# Patient Record
Sex: FEMALE | Race: BLACK OR AFRICAN AMERICAN | NOT HISPANIC OR LATINO | Employment: FULL TIME | URBAN - METROPOLITAN AREA
[De-identification: names, ages, dates, MRNs, and addresses within clinical notes are randomized per-mention and may not be internally consistent; named-entity substitution may affect disease eponyms.]

---

## 2022-02-11 ENCOUNTER — OFFICE VISIT (OUTPATIENT)
Dept: INTERNAL MEDICINE | Facility: CLINIC | Age: 27
End: 2022-02-11
Attending: INTERNAL MEDICINE
Payer: COMMERCIAL

## 2022-02-11 VITALS
DIASTOLIC BLOOD PRESSURE: 77 MMHG | HEART RATE: 86 BPM | HEIGHT: 62 IN | BODY MASS INDEX: 22.63 KG/M2 | SYSTOLIC BLOOD PRESSURE: 113 MMHG | WEIGHT: 123 LBS

## 2022-02-11 DIAGNOSIS — Z30.41 ENCOUNTER FOR SURVEILLANCE OF CONTRACEPTIVE PILLS: ICD-10-CM

## 2022-02-11 DIAGNOSIS — L65.9 HAIR LOSS: Primary | ICD-10-CM

## 2022-02-11 PROCEDURE — 99204 OFFICE O/P NEW MOD 45 MIN: CPT | Performed by: INTERNAL MEDICINE

## 2022-02-11 RX ORDER — DROSPIRENONE AND ETHINYL ESTRADIOL 0.02-3(28)
1 KIT ORAL DAILY
COMMUNITY
End: 2022-02-11

## 2022-02-11 RX ORDER — IBUPROFEN 800 MG/1
TABLET, FILM COATED ORAL
COMMUNITY
Start: 2021-04-28 | End: 2024-05-07

## 2022-02-11 RX ORDER — DROSPIRENONE AND ETHINYL ESTRADIOL 0.02-3(28)
1 KIT ORAL DAILY
Qty: 82 TABLET | Refills: 4 | Status: SHIPPED | OUTPATIENT
Start: 2022-02-11 | End: 2022-05-31

## 2022-02-11 RX ORDER — LEVOCETIRIZINE DIHYDROCHLORIDE 5 MG/1
TABLET, FILM COATED ORAL
COMMUNITY
Start: 2021-04-28 | End: 2022-02-11

## 2022-02-11 RX ORDER — PSEUDOEPHEDRINE HCL 60 MG
1 TABLET ORAL PRN
COMMUNITY
Start: 2021-04-28 | End: 2024-05-07

## 2022-02-11 RX ORDER — NORETHINDRONE ACETATE AND ETHINYL ESTRADIOL AND FERROUS FUMARATE 5-7-9-7
KIT ORAL
COMMUNITY
End: 2022-02-11

## 2022-02-11 ASSESSMENT — PAIN SCALES - GENERAL: PAINLEVEL: NO PAIN (0)

## 2022-02-11 ASSESSMENT — MIFFLIN-ST. JEOR: SCORE: 1257.2

## 2022-02-11 NOTE — LETTER
2/11/2022       RE: Joel Shah  [address] [facility] [id number]     Dear Colleague,    Thank you for referring your patient, Joel Shah, to the [facility] [place] at [facility]. Please see a copy of my visit note below.      Assessment & Plan     Hair loss  Reviewed possible causes of hair loss with patient.  Advised on common contributors, such as iron deficiency as well as acute illness, such as COVID-19.  Of note, patient did have COVID-19 earlier this year.  Recommend evaluation for common endocrine and metabolic causes.  Patient was also encouraged to talk to her dermatologist about hair loss.  - TSH with free T4 reflex; Future  - CBC with platelets; Future  - Ferritin; Future  - Comprehensive metabolic panel; Future  - Anti Nuclear Mariama IgG by IFA with Reflex; Future    Encounter for surveillance of contraceptive pills  Discussed options for birth control with patient.  She noted good relief of dysmenorrhea with birth control pill.  Reviewed weight gain and insignificant contribution of birth control pill to weight gain that was observed in clinical studies.  Patient was also advised on possible interaction between spironolactone and risperidone.  Recommend monitoring of electrolytes if she will be on a combination of the 2 medications.  Patient was also encouraged to discuss her treatment options for acne with her dermatologist.  Given that patient desires ongoing control of her menses due to dysmenorrhea, recommend restarting birth control pill.  Patient expressed understanding and agreement with the plan.  - drospirenone-ethinyl estradiol (RUFINO) 3-0.02 MG tablet; Take 1 tablet by mouth daily      I spent a total of 45 minutes on the day of the visit.   Time spent doing chart review, history and exam, documentation and further activities per the note           No follow-ups on file.    Rosario Gutierrez MD  [facility]  "[facility] Maldonado is a 26 year old who presents for the following health issues     HPI     Patient is here to discuss contraceptive options. She states that she has started birth control pill several years ago. She states that that had painful periods prior to starting birth control pill. She is currently not sexually active.   Patient states that she was treated for acne with Accutane by her dermatologist.  She was also recently started on spironolactone.  She is wondering if she should continue with spironolactone or with birth control pill.  She has not had any recent electrolyte checks since starting spironolactone.    Review of Systems   Constitutional, HEENT, cardiovascular, pulmonary, GI, , musculoskeletal, neuro, skin, endocrine and psych systems are negative, except as otherwise noted.      Objective    /77   Pulse 86   Ht 1.568 m (5' 1.75\")   Wt 55.8 kg (123 lb)   LMP 01/11/2022   Breastfeeding No   BMI 22.68 kg/m    Body mass index is 22.68 kg/m .  Physical Exam   GENERAL: healthy, alert and no distress  EYES: Eyes grossly normal to inspection, PERRL and conjunctivae and sclerae normal  NECK: no adenopathy and thyroid normal to palpation  RESP: normal effort, no wheezing  CV: regular rates and rhythm and no peripheral edema  MS: no gross musculoskeletal defects noted, no edema      "

## 2022-02-11 NOTE — NURSING NOTE
Chief Complaint   Patient presents with     Establish Care     Birth control consult   Brianna Hayes LPN

## 2022-02-11 NOTE — PROGRESS NOTES
Assessment & Plan     Hair loss  Reviewed possible causes of hair loss with patient.  Advised on common contributors, such as iron deficiency as well as acute illness, such as COVID-19.  Of note, patient did have COVID-19 earlier this year.  Recommend evaluation for common endocrine and metabolic causes.  Patient was also encouraged to talk to her dermatologist about hair loss.  - TSH with free T4 reflex; Future  - CBC with platelets; Future  - Ferritin; Future  - Comprehensive metabolic panel; Future  - Anti Nuclear Mariama IgG by IFA with Reflex; Future    Encounter for surveillance of contraceptive pills  Discussed options for birth control with patient.  She noted good relief of dysmenorrhea with birth control pill.  Reviewed weight gain and insignificant contribution of birth control pill to weight gain that was observed in clinical studies.  Patient was also advised on possible interaction between spironolactone and risperidone.  Recommend monitoring of electrolytes if she will be on a combination of the 2 medications.  Patient was also encouraged to discuss her treatment options for acne with her dermatologist.  Given that patient desires ongoing control of her menses due to dysmenorrhea, recommend restarting birth control pill.  Patient expressed understanding and agreement with the plan.  - drospirenone-ethinyl estradiol (RUFINO) 3-0.02 MG tablet; Take 1 tablet by mouth daily      I spent a total of 45 minutes on the day of the visit.   Time spent doing chart review, history and exam, documentation and further activities per the note           No follow-ups on file.    Rosario Gutierrez MD  [facility]   Joel is a 26 year old who presents for the following health issues     HPI     Patient is here to discuss contraceptive options. She states that she has started birth control pill several years ago. She states that that had painful periods prior to starting  "birth control pill. She is currently not sexually active.   Patient states that she was treated for acne with Accutane by her dermatologist.  She was also recently started on spironolactone.  She is wondering if she should continue with spironolactone or with birth control pill.  She has not had any recent electrolyte checks since starting spironolactone.    Review of Systems   Constitutional, HEENT, cardiovascular, pulmonary, GI, , musculoskeletal, neuro, skin, endocrine and psych systems are negative, except as otherwise noted.      Objective    /77   Pulse 86   Ht 1.568 m (5' 1.75\")   Wt 55.8 kg (123 lb)   LMP 01/11/2022   Breastfeeding No   BMI 22.68 kg/m    Body mass index is 22.68 kg/m .  Physical Exam   GENERAL: healthy, alert and no distress  EYES: Eyes grossly normal to inspection, PERRL and conjunctivae and sclerae normal  NECK: no adenopathy and thyroid normal to palpation  RESP: normal effort, no wheezing  CV: regular rates and rhythm and no peripheral edema  MS: no gross musculoskeletal defects noted, no edema                "

## 2022-02-27 ENCOUNTER — HEALTH MAINTENANCE LETTER (OUTPATIENT)
Age: 27
End: 2022-02-27

## 2022-05-31 ENCOUNTER — OFFICE VISIT (OUTPATIENT)
Dept: OBGYN | Facility: CLINIC | Age: 27
End: 2022-05-31
Payer: COMMERCIAL

## 2022-05-31 VITALS — WEIGHT: 133.4 LBS | SYSTOLIC BLOOD PRESSURE: 90 MMHG | BODY MASS INDEX: 24.6 KG/M2 | DIASTOLIC BLOOD PRESSURE: 62 MMHG

## 2022-05-31 DIAGNOSIS — Z11.3 SCREEN FOR STD (SEXUALLY TRANSMITTED DISEASE): ICD-10-CM

## 2022-05-31 DIAGNOSIS — Z30.41 ENCOUNTER FOR SURVEILLANCE OF CONTRACEPTIVE PILLS: Primary | ICD-10-CM

## 2022-05-31 DIAGNOSIS — Z23 NEED FOR HPV VACCINE: ICD-10-CM

## 2022-05-31 PROCEDURE — 99203 OFFICE O/P NEW LOW 30 MIN: CPT | Mod: 25 | Performed by: STUDENT IN AN ORGANIZED HEALTH CARE EDUCATION/TRAINING PROGRAM

## 2022-05-31 PROCEDURE — 87491 CHLMYD TRACH DNA AMP PROBE: CPT | Performed by: STUDENT IN AN ORGANIZED HEALTH CARE EDUCATION/TRAINING PROGRAM

## 2022-05-31 PROCEDURE — 90651 9VHPV VACCINE 2/3 DOSE IM: CPT | Performed by: STUDENT IN AN ORGANIZED HEALTH CARE EDUCATION/TRAINING PROGRAM

## 2022-05-31 PROCEDURE — 90471 IMMUNIZATION ADMIN: CPT | Performed by: STUDENT IN AN ORGANIZED HEALTH CARE EDUCATION/TRAINING PROGRAM

## 2022-05-31 RX ORDER — DESOGESTREL AND ETHINYL ESTRADIOL 0.15-0.03
1 KIT ORAL DAILY
Qty: 84 TABLET | Refills: 3 | Status: SHIPPED | OUTPATIENT
Start: 2022-05-31 | End: 2023-03-20

## 2022-05-31 RX ORDER — NORETHINDRONE ACETATE AND ETHINYL ESTRADIOL 1MG-20(21)
1 KIT ORAL DAILY
Qty: 84 TABLET | Refills: 3 | Status: CANCELLED | OUTPATIENT
Start: 2022-05-31 | End: 2023-05-31

## 2022-05-31 ASSESSMENT — ANXIETY QUESTIONNAIRES
1. FEELING NERVOUS, ANXIOUS, OR ON EDGE: SEVERAL DAYS
IF YOU CHECKED OFF ANY PROBLEMS ON THIS QUESTIONNAIRE, HOW DIFFICULT HAVE THESE PROBLEMS MADE IT FOR YOU TO DO YOUR WORK, TAKE CARE OF THINGS AT HOME, OR GET ALONG WITH OTHER PEOPLE: NOT DIFFICULT AT ALL
2. NOT BEING ABLE TO STOP OR CONTROL WORRYING: NOT AT ALL
6. BECOMING EASILY ANNOYED OR IRRITABLE: NOT AT ALL
GAD7 TOTAL SCORE: 1
GAD7 TOTAL SCORE: 1
7. FEELING AFRAID AS IF SOMETHING AWFUL MIGHT HAPPEN: NOT AT ALL
5. BEING SO RESTLESS THAT IT IS HARD TO SIT STILL: NOT AT ALL
3. WORRYING TOO MUCH ABOUT DIFFERENT THINGS: NOT AT ALL

## 2022-05-31 ASSESSMENT — LIFESTYLE VARIABLES
HOW MANY STANDARD DRINKS CONTAINING ALCOHOL DO YOU HAVE ON A TYPICAL DAY: PATIENT DOES NOT DRINK
HOW OFTEN DO YOU HAVE SIX OR MORE DRINKS ON ONE OCCASION: NEVER

## 2022-05-31 ASSESSMENT — PATIENT HEALTH QUESTIONNAIRE - PHQ9
5. POOR APPETITE OR OVEREATING: NOT AT ALL
SUM OF ALL RESPONSES TO PHQ QUESTIONS 1-9: 0

## 2022-05-31 NOTE — PROGRESS NOTES
SUBJECTIVE:                                                   Joel Shah is a 26 year old G0 female who presents to clinic today for the following health issue(s):  Patient presents with:  Menstrual Problem: Patient stated that she did not have a menstrual cycle for two to three months.  Patient stated that she did have one 05/29/2022.and is still on.           HPI:  Initially scheduled due to absent menses for 2-3 months after re-starting OCP. Just got period over the weekend. Started on OCP several years ago. Was on it for a long time due to Accutane use.  Was off it for 2-3 months due to concerns about long term use. While off of OCP, felt less bloated. Also on Spirinolactone for acne, but stopped due to needing to check potassium level.  Restarted three months ago due to acne. Feels bloated again. Increased appetite. Absent menses during this. Same formulation since sophomore year of college. Would like contraception that will not increase appetite, result in regular monthly menses, and improve acne. No history of HTN, blood clots, migraine HA. Not sexually active. Pap done [facility]. Has had one dose of HPV vaccine. Would like second dose today.     Patient's last menstrual period was 05/29/2022 (exact date)..     Patient is not currently sexually active.  Using oral contraceptives for contraception.    reports that she has never smoked. She has never used smokeless tobacco.    STD testing offered?  Accepted        Today's PHQ-2 Score:   PHQ-2 ( 1999 Pfizer) 5/31/2022   Q1: Little interest or pleasure in doing things 0   Q2: Feeling down, depressed or hopeless 0   PHQ-2 Score 0     Today's PHQ-9 Score:   PHQ-9 SCORE 5/31/2022   PHQ-9 Total Score 0     Today's RAVEN-7 Score:   RAVEN-7 SCORE 5/31/2022   Total Score 1       Problem list and histories reviewed & adjusted, as indicated.  Additional history: as documented.    Patient Active Problem List   Diagnosis     Seizure (H)     No past surgical  history on file.   Social History     Tobacco Use     Smoking status: Never Smoker     Smokeless tobacco: Never Used   Substance Use Topics     Alcohol use: No      Problem (# of Occurrences) Relation (Name,Age of Onset)    Diabetes (2) Maternal Grandmother, Maternal Grandfather    Hypertension (2) Maternal Grandmother, Maternal Grandfather            Current Outpatient Medications   Medication Sig     desogestrel-ethinyl estradiol (APRI) 0.15-30 MG-MCG tablet Take 1 tablet by mouth daily     drospirenone-ethinyl estradiol (RUFINO) 3-0.02 MG tablet Take 1 tablet by mouth daily     ibuprofen (ADVIL/MOTRIN) 800 MG tablet 1 tablet with food or milk as needed     pseudoePHEDrine (SUDAFED) 60 MG tablet Take 1 tablet by mouth as needed     No current facility-administered medications for this visit.     No Known Allergies      OBJECTIVE:     BP 90/62   Wt 60.5 kg (133 lb 6.4 oz)   LMP 05/29/2022 (Exact Date)   Breastfeeding No   BMI 24.60 kg/m    Body mass index is 24.6 kg/m .    Exam:  Constitutional:  Appearance: Well nourished, well developed alert, in no acute distress       ASSESSMENT/PLAN:                                                        ICD-10-CM    1. Encounter for surveillance of contraceptive pills  Z30.41 desogestrel-ethinyl estradiol (APRI) 0.15-30 MG-MCG tablet   2. Screen for STD (sexually transmitted disease)  Z11.3 Chlamydia trachomatis PCR - Clinic Collect       Joel Shah is a 26 year old G0 who presents to discuss alternative form of OCP due to side effects of bloating, amenorrhea, and acne on Rufino. Reviewed PMH- no contraindications to OCP use. Briefly discussed alternative forms of non-systemic contraception which may minimize bloating side effects, however would result in amenorrhea and possibly worsen or not improve acne. Prefers to take pill daily. Discussed POP, however again this would not improve acne. Discussed increased dose of estrogen in combined OCP to stimulate lining of  endometrium to aid in more regular menses. Will initiate desogestrel-ethinyl estradiol (APRI) 0.15-30 MG-MCG tablet. If not improving side effects, schedule follow-up to discuss alternatives.   HM updated. Chlamydia screen and dose #2 of HPV vax today. Pap due 2024.     Brianna Ortiz MD, MHS  [facility] [place]  05/31/22

## 2022-06-01 LAB — C TRACH DNA SPEC QL NAA+PROBE: NEGATIVE

## 2022-11-19 ENCOUNTER — HEALTH MAINTENANCE LETTER (OUTPATIENT)
Age: 27
End: 2022-11-19

## 2023-03-20 DIAGNOSIS — Z30.41 ENCOUNTER FOR SURVEILLANCE OF CONTRACEPTIVE PILLS: ICD-10-CM

## 2023-03-20 RX ORDER — DESOGESTREL AND ETHINYL ESTRADIOL 0.15-0.03
1 KIT ORAL DAILY
Qty: 84 TABLET | Refills: 0 | Status: SHIPPED | OUTPATIENT
Start: 2023-03-20 | End: 2023-06-15

## 2023-03-20 NOTE — TELEPHONE ENCOUNTER
Requested Prescriptions   Pending Prescriptions Disp Refills     desogestrel-ethinyl estradiol (APRI) 0.15-30 MG-MCG tablet 84 tablet 3     Sig: Take 1 tablet by mouth daily       There is no refill protocol information for this order        Last Written Prescription Date:  5/31/22  Last Fill Quantity: 84,  # refills: 3   Last office visit: 5/31/2022 with prescribing provider:  Diana   Future Office Visit:    Future Appointments 3/20/2023 - 9/16/2023    None        Prescription approved per [facility] Refill Protocol.    Yolanda Smith RN

## 2023-04-09 ENCOUNTER — HEALTH MAINTENANCE LETTER (OUTPATIENT)
Age: 28
End: 2023-04-09

## 2023-06-15 DIAGNOSIS — Z30.41 ENCOUNTER FOR SURVEILLANCE OF CONTRACEPTIVE PILLS: ICD-10-CM

## 2023-06-15 RX ORDER — DESOGESTREL AND ETHINYL ESTRADIOL 0.15-0.03
1 KIT ORAL DAILY
Qty: 28 TABLET | Refills: 0 | Status: SHIPPED | OUTPATIENT
Start: 2023-06-15 | End: 2023-06-20

## 2023-06-15 NOTE — TELEPHONE ENCOUNTER
"Requested Prescriptions   Pending Prescriptions Disp Refills     desogestrel-ethinyl estradiol (APRI) 0.15-30 MG-MCG tablet 84 tablet 0     Sig: Take 1 tablet by mouth daily       Contraceptives Protocol Failed - 6/15/2023 10:32 AM        Failed - Recent (12 mo) or future (30 days) visit within the authorizing provider's specialty     Patient has had an office visit with the authorizing provider or a provider within the authorizing providers department within the previous 12 mos or has a future within next 30 days. See \"Patient Info\" tab in inbasket, or \"Choose Columns\" in Meds & Orders section of the refill encounter.              Passed - Patient is not a current smoker if age is 35 or older        Passed - Medication is active on med list        Passed - No active pregnancy on record        Passed - No positive pregnancy test in past 12 months           Last Written Prescription Date:  3/20/23  Last Fill Quantity: 84,  # refills: 0   Last office visit: 5/31/2022 ; last virtual visit: Visit date not found with prescribing provider:  Diana   Future Office Visit:      One month refill approved   Appointment needed for further refills  Sharri Bailon RN on 6/15/2023 at 1:14 PM          "

## 2023-06-20 ENCOUNTER — TELEPHONE (OUTPATIENT)
Dept: OBGYN | Facility: CLINIC | Age: 28
End: 2023-06-20
Payer: COMMERCIAL

## 2023-06-20 DIAGNOSIS — Z30.41 ENCOUNTER FOR SURVEILLANCE OF CONTRACEPTIVE PILLS: ICD-10-CM

## 2023-06-20 RX ORDER — DESOGESTREL AND ETHINYL ESTRADIOL 0.15-0.03
1 KIT ORAL DAILY
Qty: 28 TABLET | Refills: 1 | Status: SHIPPED | OUTPATIENT
Start: 2023-06-20 | End: 2023-07-25

## 2023-06-20 NOTE — TELEPHONE ENCOUNTER
Pt needs a refill on her birth control desogestrel-ethinyl estradiol (APRI) 0.15-30 MG-MCG tablet  She also made her Physical with Dr Ortiz in July

## 2023-07-25 ENCOUNTER — OFFICE VISIT (OUTPATIENT)
Dept: OBGYN | Facility: CLINIC | Age: 28
End: 2023-07-25
Payer: COMMERCIAL

## 2023-07-25 VITALS
BODY MASS INDEX: 25.32 KG/M2 | HEIGHT: 62 IN | WEIGHT: 137.6 LBS | SYSTOLIC BLOOD PRESSURE: 94 MMHG | DIASTOLIC BLOOD PRESSURE: 58 MMHG

## 2023-07-25 DIAGNOSIS — Z12.4 SCREENING FOR CERVICAL CANCER: ICD-10-CM

## 2023-07-25 DIAGNOSIS — Z01.419 ENCOUNTER FOR GYNECOLOGICAL EXAMINATION WITHOUT ABNORMAL FINDING: Primary | ICD-10-CM

## 2023-07-25 DIAGNOSIS — Z30.41 ENCOUNTER FOR SURVEILLANCE OF CONTRACEPTIVE PILLS: ICD-10-CM

## 2023-07-25 PROCEDURE — 99395 PREV VISIT EST AGE 18-39: CPT | Performed by: STUDENT IN AN ORGANIZED HEALTH CARE EDUCATION/TRAINING PROGRAM

## 2023-07-25 PROCEDURE — G0145 SCR C/V CYTO,THINLAYER,RESCR: HCPCS | Performed by: STUDENT IN AN ORGANIZED HEALTH CARE EDUCATION/TRAINING PROGRAM

## 2023-07-25 RX ORDER — TRETINOIN 0.25 MG/G
CREAM TOPICAL
COMMUNITY
Start: 2022-09-06

## 2023-07-25 RX ORDER — DESOGESTREL AND ETHINYL ESTRADIOL 0.15-0.03
1 KIT ORAL DAILY
Qty: 28 TABLET | Refills: 1 | Status: SHIPPED | OUTPATIENT
Start: 2023-07-25 | End: 2023-08-15

## 2023-07-25 RX ORDER — SPIRONOLACTONE 50 MG/1
1 TABLET, FILM COATED ORAL
COMMUNITY
Start: 2023-07-06

## 2023-07-25 ASSESSMENT — ANXIETY QUESTIONNAIRES
6. BECOMING EASILY ANNOYED OR IRRITABLE: NOT AT ALL
5. BEING SO RESTLESS THAT IT IS HARD TO SIT STILL: NOT AT ALL
7. FEELING AFRAID AS IF SOMETHING AWFUL MIGHT HAPPEN: NOT AT ALL
1. FEELING NERVOUS, ANXIOUS, OR ON EDGE: NOT AT ALL
GAD7 TOTAL SCORE: 0
IF YOU CHECKED OFF ANY PROBLEMS ON THIS QUESTIONNAIRE, HOW DIFFICULT HAVE THESE PROBLEMS MADE IT FOR YOU TO DO YOUR WORK, TAKE CARE OF THINGS AT HOME, OR GET ALONG WITH OTHER PEOPLE: NOT DIFFICULT AT ALL
GAD7 TOTAL SCORE: 0
2. NOT BEING ABLE TO STOP OR CONTROL WORRYING: NOT AT ALL
3. WORRYING TOO MUCH ABOUT DIFFERENT THINGS: NOT AT ALL

## 2023-07-25 ASSESSMENT — PATIENT HEALTH QUESTIONNAIRE - PHQ9
5. POOR APPETITE OR OVEREATING: NOT AT ALL
SUM OF ALL RESPONSES TO PHQ QUESTIONS 1-9: 0

## 2023-07-25 NOTE — PROGRESS NOTES
[facility]  OB/GYN Clinic Note    Joel is a 27 year old  female who presents for annual exam.   Besides routine health maintenance, she has no other health concerns today .    HPI:  The patient's PCP is none    Patient presents for annual exam.   Pap due today.   Sexualy active- no, STI- no concerns. Contraception- OCP, no medical contraindications.   Family hx of breast, uterine, ovarian cancer- negative  Lives with family, feels safe. Denies tobacco use, drug use, alcohol use. Working for [employer].    GYNECOLOGIC HISTORY:  Patient's last menstrual period was 2023.  Regular menses? yes  Menses every 28 days.  Length of menses: 4 -5 days    Her current contraception method is: oral contraceptives.  She  reports that she has never smoked. She has never used smokeless tobacco.  Patient is not sexually active.  STD testing offered?  Declined  Last PHQ-9 score on record =       2023     8:12 AM   PHQ-9 SCORE   PHQ-9 Total Score 0     Last GAD7 score on record =       2023     8:12 AM   RAVEN-7 SCORE   Total Score 0     Alcohol Score = 0    HEALTH MAINTENANCE:  Health maintenance updated:  yes  Overdue          Never   Done ADVANCE CARE PLANNING (Every 5 Years)     Never   Done HEPATITIS B IMMUNIZATION (1 of 3 - 3-dose series)     Never   Done HIV SCREENING (Once)     Never   Done HEPATITIS C SCREENING (Once)     2022 HPV IMMUNIZATION (2 - 3-dose series)  Last completed: May 31, 2022     Never   Done DTAP/TDAP/TD IMMUNIZATION (1 - Tdap)     2023 PHQ-2 (once per calendar year) (Yearly, January to December)  Last completed: May 31, 2022        Upcoming          SEP 1   2023 INFLUENZA VACCINE (1)  Last completed: Dec 1, 2021     VANDANA 1   2024 PAP (Every 3 Years)   Order placed this encounter   Postponed by Umm Noyola MA (Other)     2024 YEARLY PREVENTIVE VISIT (Yearly)  Last completed: 2023     HISTORY:  OB  "History    Para Term  AB Living   0 0 0 0 0 0   SAB IAB Ectopic Multiple Live Births   0 0 0 0 0       Patient Active Problem List   Diagnosis    Seizure (H)     History reviewed. No pertinent surgical history.   Social History     Tobacco Use    Smoking status: Never    Smokeless tobacco: Never   Substance Use Topics    Alcohol use: No      Problem (# of Occurrences) Relation (Name,Age of Onset)    Diabetes (2) Maternal Grandmother, Maternal Grandfather    Hypertension (2) Maternal Grandmother, Maternal Grandfather              Current Outpatient Medications   Medication Sig    desogestrel-ethinyl estradiol (APRI) 0.15-30 MG-MCG tablet Take 1 tablet by mouth daily    Multiple Vitamins-Minerals (MULTIVITAMIN ADULTS PO)     spironolactone (ALDACTONE) 50 MG tablet Take 1 tablet by mouth 2 times daily    tretinoin (RETIN-A) 0.025 % external cream APPLY PEA SIZED AMOUNT TO ENTIRE FACE NIGHTLY AS TOLERATED. FOLLOW WITH MOISTURIZER.    ibuprofen (ADVIL/MOTRIN) 800 MG tablet 1 tablet with food or milk as needed (Patient not taking: Reported on 2023)    pseudoePHEDrine (SUDAFED) 60 MG tablet Take 1 tablet by mouth as needed (Patient not taking: Reported on 2023)     No current facility-administered medications for this visit.     No Known Allergies    Past medical, surgical, social and family histories were reviewed and updated in EPIC.    EXAM:  BP 94/58   Ht 1.575 m (5' 2\")   Wt 62.4 kg (137 lb 9.6 oz)   LMP 2023   BMI 25.17 kg/m     BMI: Body mass index is 25.17 kg/m .    PHYSICAL EXAM:  Constitutional:   Appearance: Well nourished, well developed, alert, in no acute distress  Neck:  Lymph Nodes:  No lymphadenopathy present    Thyroid:  Gland size normal, nontender, no nodules or masses present  on palpation  Chest:  Respiratory Effort:  Breathing unlabored  Cardiovascular:    Heart: Auscultation:  Regular rate, normal rhythm, no murmurs present  Breasts: Inspection of Breasts:  No " lymphadenopathy present., Palpation of Breasts and Axillae:  No masses present on palpation, no breast tenderness., Axillary Lymph Nodes:  No lymphadenopathy present., and No nodularity, asymmetry or nipple discharge bilaterally.    Neurologic:    Mental Status:  Oriented X3.  Normal strength and tone, sensory exam                grossly normal, mentation intact and speech normal.    Psychiatric:   Mentation appears normal and affect normal/bright.         Pelvic Exam:  External Genitalia:     Normal appearance for age, no discharge present, no tenderness present, no inflammatory lesions present, color normal  Vagina:     Normal vaginal vault without central or paravaginal defects, no discharge present, no inflammatory lesions present, no masses present  Urethra:   Urethral Body:  Urethra palpation normal, urethra structural support normal   Urethral Meatus:  No erythema or lesions present  Cervix:     Appearance healthy, no lesions present, nontender to palpation, no bleeding present    COUNSELING:   Reviewed preventive health counseling, as reflected in patient instructions       Contraception       Safe sex practices/STD prevention        ASSESSMENT:  27 year old female with satisfactory annual exam.    ICD-10-CM    1. Encounter for gynecological examination without abnormal finding  Z01.419 Lipid panel reflex to direct LDL Fasting     Glucose      2. Encounter for surveillance of contraceptive pills  Z30.41 desogestrel-ethinyl estradiol (APRI) 0.15-30 MG-MCG tablet      3. Screening for cervical cancer  Z12.4 Pap thin layer screen reflex to HPV if ASCUS - recommended age 25 - 29 years          PLAN:  Normal gyn exam. Pap collected. Reviewed pap guidelines. Reviewed breast self awareness. Work paperwork signed for preventative visit. Recommend diabetes, lipid screening, orders placed.   Rx for OCP refilled. No changes to medical history.   Follow-up for annual exam.     Biranna Ortiz MD, MHS  07/25/23

## 2023-07-28 LAB
BKR LAB AP GYN ADEQUACY: NORMAL
BKR LAB AP GYN INTERPRETATION: NORMAL
BKR LAB AP HPV REFLEX: NORMAL
BKR LAB AP PREVIOUS ABNORMAL: NORMAL
PATH REPORT.COMMENTS IMP SPEC: NORMAL
PATH REPORT.COMMENTS IMP SPEC: NORMAL
PATH REPORT.RELEVANT HX SPEC: NORMAL

## 2023-08-15 DIAGNOSIS — Z30.41 ENCOUNTER FOR SURVEILLANCE OF CONTRACEPTIVE PILLS: ICD-10-CM

## 2023-08-15 RX ORDER — DESOGESTREL AND ETHINYL ESTRADIOL 0.15-0.03
1 KIT ORAL DAILY
Qty: 84 TABLET | Refills: 3 | Status: SHIPPED | OUTPATIENT
Start: 2023-08-15 | End: 2024-08-01

## 2023-08-15 NOTE — CONFIDENTIAL NOTE
"Requested Prescriptions   Pending Prescriptions Disp Refills    desogestrel-ethinyl estradiol (APRI) 0.15-30 MG-MCG tablet 28 tablet 1     Sig: Take 1 tablet by mouth daily       Contraceptives Protocol Passed - 8/15/2023 12:51 PM        Passed - Patient is not a current smoker if age is 35 or older        Passed - Recent (12 mo) or future (30 days) visit within the authorizing provider's specialty     Patient has had an office visit with the authorizing provider or a provider within the authorizing providers department within the previous 12 mos or has a future within next 30 days. See \"Patient Info\" tab in inbasket, or \"Choose Columns\" in Meds & Orders section of the refill encounter.              Passed - Medication is active on med list        Passed - No active pregnancy on record        Passed - No positive pregnancy test in past 12 months           Last Written Prescription Date:  7/25/23  Last Fill Quantity: 28,  # refills: 1   Last office visit: 7/25/2023 ; last virtual visit: Visit date not found with prescribing provider:  Dr Ortiz -  Rx for OCP refilled. No changes to medical history.   Follow-up for annual exam.    Brianna Ortiz MD, S  07/25/23  Future Office Visit:      Prescription approved per [facility] Refill Protocol.  Coco Vaughn RN on 8/15/2023 at 12:56 PM        "

## 2023-08-15 NOTE — TELEPHONE ENCOUNTER
"Requested Prescriptions   Pending Prescriptions Disp Refills    desogestrel-ethinyl estradiol (APRI) 0.15-30 MG-MCG tablet 28 tablet 1     Sig: Take 1 tablet by mouth daily       Contraceptives Protocol Passed - 8/15/2023 12:51 PM        Passed - Patient is not a current smoker if age is 35 or older        Passed - Recent (12 mo) or future (30 days) visit within the authorizing provider's specialty     Patient has had an office visit with the authorizing provider or a provider within the authorizing providers department within the previous 12 mos or has a future within next 30 days. See \"Patient Info\" tab in inbasket, or \"Choose Columns\" in Meds & Orders section of the refill encounter.              Passed - Medication is active on med list        Passed - No active pregnancy on record        Passed - No positive pregnancy test in past 12 months           Last Written Prescription Date:  07/25/2023  Last Fill Quantity: 28,  # refills: 1   Last office visit: 7/25/2023 ; last virtual visit: Visit date not found with prescribing provider:  Brianna Ortiz   Future Office Visit:            "

## 2024-04-30 SDOH — HEALTH STABILITY: PHYSICAL HEALTH: ON AVERAGE, HOW MANY DAYS PER WEEK DO YOU ENGAGE IN MODERATE TO STRENUOUS EXERCISE (LIKE A BRISK WALK)?: 4 DAYS

## 2024-04-30 SDOH — HEALTH STABILITY: PHYSICAL HEALTH: ON AVERAGE, HOW MANY MINUTES DO YOU ENGAGE IN EXERCISE AT THIS LEVEL?: 60 MIN

## 2024-04-30 ASSESSMENT — SOCIAL DETERMINANTS OF HEALTH (SDOH): HOW OFTEN DO YOU GET TOGETHER WITH FRIENDS OR RELATIVES?: MORE THAN THREE TIMES A WEEK

## 2024-05-07 ENCOUNTER — OFFICE VISIT (OUTPATIENT)
Dept: FAMILY MEDICINE | Facility: CLINIC | Age: 29
End: 2024-05-07
Payer: COMMERCIAL

## 2024-05-07 VITALS
SYSTOLIC BLOOD PRESSURE: 131 MMHG | HEART RATE: 72 BPM | BODY MASS INDEX: 26.87 KG/M2 | OXYGEN SATURATION: 100 % | HEIGHT: 62 IN | DIASTOLIC BLOOD PRESSURE: 76 MMHG | RESPIRATION RATE: 18 BRPM | TEMPERATURE: 98.2 F | WEIGHT: 146 LBS

## 2024-05-07 DIAGNOSIS — R56.9 SEIZURES (H): Primary | ICD-10-CM

## 2024-05-07 DIAGNOSIS — R63.5 WEIGHT GAIN: ICD-10-CM

## 2024-05-07 PROCEDURE — 99213 OFFICE O/P EST LOW 20 MIN: CPT

## 2024-05-07 ASSESSMENT — PAIN SCALES - GENERAL: PAINLEVEL: NO PAIN (0)

## 2024-05-07 NOTE — PROGRESS NOTES
"  Assessment & Plan     Seizures (H)  Remote, not currently taking medications. Last seizure about a year ago.    Weight gain  Symptoms per HPI. Discussed optimizing nutrition with current exercise regimen. Gradual gain started around the time of changing COCs. Advised to follow-up with OBGYN to see if there are alternative options for contraception and acne. Could consider looking into metabolic or other deficiencies in the future. However, pt preference to start with referral to dietician and looking into COCs as potential cause.  - Nutrition Referral; Future        25 minutes spent by me on the date of the encounter doing chart review, history and exam, documentation and further activities per the note      BMI  Estimated body mass index is 26.7 kg/m  as calculated from the following:    Height as of this encounter: 1.575 m (5' 2\").    Weight as of this encounter: 66.2 kg (146 lb).   Weight management plan: Discussed healthy diet and exercise guidelines    Counseling  Appropriate preventive services were discussed with this patient, including applicable screening as appropriate for fall prevention, nutrition, physical activity, Tobacco-use cessation, weight loss and cognition.  Checklist reviewing preventive services available has been given to the patient.  Reviewed patient's diet, addressing concerns and/or questions.       FUTURE APPOINTMENTS:       - Follow-up for annual visit or as needed    Hanna Maldonado is a 28 year old, presenting for the following health issues:  Establish Care and Weight Problem    Here to establish care and discuss weight  Feels over the past couple years she has gained 15-20#. Feels she is eating clean and exercising daily. She enjoys running and doing aerobic classes and walking 20,000 steps/daily. Feels her legs may be stronger but generally feels like she looks heavier than previous.  Had last seizure about a year ago. Believes this was situational so not currently taking " "anti-seizure medications (had taken previously)  Was taking spironolactone for acne however has been off of this for some time  Previously taking Loryna COCs for acne but per chart review she had some side effects (increased bloating, amenorrhea).                   Review of Systems  Constitutional, HEENT, cardiovascular, pulmonary, gi and gu systems are negative, except as otherwise noted.      Objective    /76 (BP Location: Left arm, Patient Position: Sitting, Cuff Size: Adult Regular)   Pulse 72   Temp 98.2  F (36.8  C) (Temporal)   Resp 18   Ht 1.575 m (5' 2\")   Wt 66.2 kg (146 lb)   LMP 04/23/2024 (Approximate)   SpO2 100%   BMI 26.70 kg/m    Body mass index is 26.7 kg/m .  Physical Exam  Vitals reviewed.   Constitutional:       Appearance: Normal appearance.   HENT:      Head: Normocephalic.   Eyes:      Pupils: Pupils are equal, round, and reactive to light.   Cardiovascular:      Rate and Rhythm: Normal rate.   Pulmonary:      Effort: Pulmonary effort is normal. No respiratory distress.   Musculoskeletal:         General: Normal range of motion.   Neurological:      Mental Status: She is alert and oriented to person, place, and time.   Psychiatric:         Mood and Affect: Mood normal.         Behavior: Behavior normal.                    Signed Electronically by: Rosetta Mott, MANJIT, APRN, CNP    "

## 2024-06-15 ENCOUNTER — HEALTH MAINTENANCE LETTER (OUTPATIENT)
Age: 29
End: 2024-06-15

## 2024-08-28 NOTE — PROGRESS NOTES
"[facility]  OB/GYN Clinic Note    Joel is a 28 year old  female who presents for annual exam.   Besides routine health maintenance,  she would like to discuss acne, weight gain.    HPI:  The patient's PCP is Rosetta Mott DNP.    Patient presents for annual exam. Has questions about acne-on Spirinolactone for a long time. Wondering about alternatives.   Pap done last year, NILM. Repeat .    Sexualy active- not currently, STI- denies concerns. Contraception- OCP.   Immunizations: due for flu, will receive at work.   Family hx of breast, uterine, ovarian cancer- no changes  Diet/exercise- states diet could be better, does not have time for exercise due to work.   Lives with family, feels safe. Denies tobacco use, drug use. Occ alcohol use.       GYNECOLOGIC HISTORY:    Patient's last menstrual period was 2024 (exact date).    Regular menses? yes  Menses every 28 days.  Length of menses: 5 days    Her current contraception method is: oral contraceptives.  She  reports that she has never smoked. She has never used smokeless tobacco.  Patient is not sexually active.  STD testing offered?  Declined  Last PHQ-9 score on record =       2024     8:39 AM   PHQ-9 SCORE   PHQ-9 Total Score 1     Last GAD7 score on record =       2024     8:39 AM   RAVEN-7 SCORE   Total Score 0     Alcohol Score = 1    HEALTH MAINTENANCE:  Cholesterol: (No results found for: \"CHOL\"   Last Mammo: Not applicable, Result: Not applicable, Next Mammo: Due at age 42   Pap:   Lab Results   Component Value Date    GYNINTERP  2023     Negative for Intraepithelial Lesion or Malignancy (NILM)      Colonoscopy:  NA, Result: Not applicable, Next Colonoscopy: Due at age 47.  Dexa:  NA    Health maintenance updated:  yes    HISTORY:  OB History    Para Term  AB Living   0 0 0 0 0 0   SAB IAB Ectopic Multiple Live Births   0 0 0 0 0       Patient Active Problem List   Diagnosis    Seizure (H) " "   Seizures (H)     History reviewed. No pertinent surgical history.   Social History     Tobacco Use    Smoking status: Never    Smokeless tobacco: Never   Substance Use Topics    Alcohol use: No      Problem (# of Occurrences) Relation (Name,Age of Onset)    Diabetes (2) Maternal Grandmother (Destiny Roland), Maternal Grandfather (Paul Roland)    Hypertension (2) Maternal Grandmother (Destiny Roland), Maternal Grandfather (Paul Roland)              Current Outpatient Medications   Medication Sig Dispense Refill    drospirenone-ethinyl estradiol (RUFINO) 3-0.02 MG tablet Take 1 tablet by mouth daily. 84 tablet 4    desogestrel-ethinyl estradiol (APRI) 0.15-30 MG-MCG tablet Take 1 tablet by mouth daily 84 tablet 0    Multiple Vitamins-Minerals (MULTIVITAMIN ADULTS PO)       spironolactone (ALDACTONE) 50 MG tablet Take 1 tablet by mouth 2 times daily (Patient not taking: Reported on 5/7/2024)      tretinoin (RETIN-A) 0.025 % external cream APPLY PEA SIZED AMOUNT TO ENTIRE FACE NIGHTLY AS TOLERATED. FOLLOW WITH MOISTURIZER.       No current facility-administered medications for this visit.     No Known Allergies    Past medical, surgical, social and family histories were reviewed and updated in EPIC.      EXAM:  /72   Ht 1.575 m (5' 2\")   Wt 68.3 kg (150 lb 9.6 oz)   LMP 08/24/2024 (Exact Date)   BMI 27.55 kg/m     BMI: Body mass index is 27.55 kg/m .    PHYSICAL EXAM:  Constitutional:   Appearance: Well nourished, well developed, alert, in no acute distress  Breasts: Inspection of Breasts:  No lymphadenopathy present., Palpation of Breasts and Axillae:  No masses present on palpation, no breast tenderness., Axillary Lymph Nodes:  No lymphadenopathy present., and No nodularity, asymmetry or nipple discharge bilaterally.         No Pelvic Exam performed    COUNSELING:   Reviewed preventive health counseling, as reflected in patient instructions       Regular exercise       Healthy diet/nutrition       " Contraception    BMI: Body mass index is 27.55 kg/m .  Weight management plan: Discussed healthy diet and exercise guidelines    ASSESSMENT:  28 year old female with satisfactory annual exam.    ICD-10-CM    1. Women's annual routine gynecological examination  Z01.419       2. Encounter for surveillance of contraceptive pills  Z30.41 drospirenone-ethinyl estradiol (RUFINO) 3-0.02 MG tablet      3. Overweight with body mass index (BMI) of 27 to 27.9 in adult  E66.3 Adult Nutrition  Referral    Z68.27 Glucose     Lipid panel reflex to direct LDL Fasting          PLAN:  Seen today for annual gyn exam. Pap up to date. No pelvic concerns. Normal breast exam.   Discussed alternatives for treatment of acne. Recommend Rufino. Rx for 1 year provided. If not improving, recommend follow-up with dermatology  Discussed weight gain, labs. Recommend routine screening labs. Encouraged dedicated effort for healthy diet and exercise regimen. Accepting of nutrition referral. If having excessive weight gain despite maintaining healthy lifestyle, would recommend further eval.     Brianna Ortiz MD, MHS  09/11/24

## 2024-09-11 ENCOUNTER — OFFICE VISIT (OUTPATIENT)
Dept: OBGYN | Facility: CLINIC | Age: 29
End: 2024-09-11
Payer: COMMERCIAL

## 2024-09-11 VITALS
BODY MASS INDEX: 27.71 KG/M2 | DIASTOLIC BLOOD PRESSURE: 72 MMHG | WEIGHT: 150.6 LBS | SYSTOLIC BLOOD PRESSURE: 108 MMHG | HEIGHT: 62 IN

## 2024-09-11 DIAGNOSIS — Z01.419 WOMEN'S ANNUAL ROUTINE GYNECOLOGICAL EXAMINATION: Primary | ICD-10-CM

## 2024-09-11 DIAGNOSIS — E66.3 OVERWEIGHT WITH BODY MASS INDEX (BMI) OF 27 TO 27.9 IN ADULT: ICD-10-CM

## 2024-09-11 DIAGNOSIS — Z30.41 ENCOUNTER FOR SURVEILLANCE OF CONTRACEPTIVE PILLS: ICD-10-CM

## 2024-09-11 LAB
CHOLEST SERPL-MCNC: 181 MG/DL
FASTING STATUS PATIENT QL REPORTED: YES
FASTING STATUS PATIENT QL REPORTED: YES
GLUCOSE SERPL-MCNC: 91 MG/DL (ref 70–99)
HDLC SERPL-MCNC: 78 MG/DL
LDLC SERPL CALC-MCNC: 93 MG/DL
NONHDLC SERPL-MCNC: 103 MG/DL
TRIGL SERPL-MCNC: 51 MG/DL

## 2024-09-11 PROCEDURE — 82947 ASSAY GLUCOSE BLOOD QUANT: CPT | Performed by: STUDENT IN AN ORGANIZED HEALTH CARE EDUCATION/TRAINING PROGRAM

## 2024-09-11 PROCEDURE — 80061 LIPID PANEL: CPT | Performed by: STUDENT IN AN ORGANIZED HEALTH CARE EDUCATION/TRAINING PROGRAM

## 2024-09-11 PROCEDURE — 99395 PREV VISIT EST AGE 18-39: CPT | Performed by: STUDENT IN AN ORGANIZED HEALTH CARE EDUCATION/TRAINING PROGRAM

## 2024-09-11 PROCEDURE — 99213 OFFICE O/P EST LOW 20 MIN: CPT | Mod: 25 | Performed by: STUDENT IN AN ORGANIZED HEALTH CARE EDUCATION/TRAINING PROGRAM

## 2024-09-11 PROCEDURE — 36415 COLL VENOUS BLD VENIPUNCTURE: CPT | Performed by: STUDENT IN AN ORGANIZED HEALTH CARE EDUCATION/TRAINING PROGRAM

## 2024-09-11 RX ORDER — DESOGESTREL AND ETHINYL ESTRADIOL 0.15-0.03
1 KIT ORAL DAILY
Qty: 84 TABLET | Refills: 3 | Status: CANCELLED | OUTPATIENT
Start: 2024-09-11

## 2024-09-11 RX ORDER — DROSPIRENONE AND ETHINYL ESTRADIOL 0.02-3(28)
1 KIT ORAL DAILY
Qty: 84 TABLET | Refills: 4 | Status: SHIPPED | OUTPATIENT
Start: 2024-09-11

## 2024-09-11 RX ORDER — SPIRONOLACTONE 50 MG/1
50 TABLET, FILM COATED ORAL
Qty: 90 TABLET | Refills: 3 | Status: CANCELLED | OUTPATIENT
Start: 2024-09-11

## 2024-09-11 ASSESSMENT — ANXIETY QUESTIONNAIRES
1. FEELING NERVOUS, ANXIOUS, OR ON EDGE: NOT AT ALL
IF YOU CHECKED OFF ANY PROBLEMS ON THIS QUESTIONNAIRE, HOW DIFFICULT HAVE THESE PROBLEMS MADE IT FOR YOU TO DO YOUR WORK, TAKE CARE OF THINGS AT HOME, OR GET ALONG WITH OTHER PEOPLE: NOT DIFFICULT AT ALL
6. BECOMING EASILY ANNOYED OR IRRITABLE: NOT AT ALL
GAD7 TOTAL SCORE: 0
2. NOT BEING ABLE TO STOP OR CONTROL WORRYING: NOT AT ALL
7. FEELING AFRAID AS IF SOMETHING AWFUL MIGHT HAPPEN: NOT AT ALL
GAD7 TOTAL SCORE: 0
5. BEING SO RESTLESS THAT IT IS HARD TO SIT STILL: NOT AT ALL
3. WORRYING TOO MUCH ABOUT DIFFERENT THINGS: NOT AT ALL

## 2024-09-11 ASSESSMENT — PATIENT HEALTH QUESTIONNAIRE - PHQ9
5. POOR APPETITE OR OVEREATING: NOT AT ALL
SUM OF ALL RESPONSES TO PHQ QUESTIONS 1-9: 1

## 2024-10-24 DIAGNOSIS — Z30.41 ENCOUNTER FOR SURVEILLANCE OF CONTRACEPTIVE PILLS: ICD-10-CM

## 2024-10-24 RX ORDER — DESOGESTREL AND ETHINYL ESTRADIOL 0.15-0.03
1 KIT ORAL DAILY
Qty: 84 TABLET | Refills: 0 | OUTPATIENT
Start: 2024-10-24

## 2024-10-24 NOTE — TELEPHONE ENCOUNTER
Requested Prescriptions   Pending Prescriptions Disp Refills    desogestrel-ethinyl estradiol (APRI) 0.15-30 MG-MCG tablet 84 tablet 0     Sig: Take 1 tablet by mouth daily.       Contraceptives Protocol Passed - 10/24/2024 10:10 AM        Passed - Patient is not a current smoker if age is 35 or older        Passed - Medication is active on med list        Passed - Recent (12 mo) or future (90 days) visit within the authorizing provider's specialty     The patient must have completed an in-person or virtual visit within the past 12 months or has a future visit scheduled within the next 90 days with the authorizing provider s specialty.  Urgent care and e-visits do not quality as an office visit for this protocol.          Passed - Medication indicated for associated diagnosis     Medication is associated with one or more of the following diagnoses:  Contraception  Acne  Dysmenorrhea  Menorrhagia  Amenorrhea  PCOS  Premenstrual Dysphoric Disorder  Irregular menses  Endometriosis          Passed - No active pregnancy on record        Passed - No positive pregnancy test in past 12 months

## 2024-10-24 NOTE — TELEPHONE ENCOUNTER
Last Written Prescription Date:  8/1/24  Last Fill Quantity: 84,  # refills: 0   Last office visit: 9/11/2024 ; last virtual visit: Visit date not found with prescribing provider:  Diana    Future Office Visit:  none    Discussed alternatives for treatment of acne. Recommend Heike. Rx for 1 year provided. If not improving, recommend follow-up with dermatology       Rx denied. Pt Rx'd Heike for year on 9/11/24    Kenia Angulo RN on 10/24/2024 at 1:37 PM  [facility] Triage

## 2025-06-24 DIAGNOSIS — Z30.41 ENCOUNTER FOR SURVEILLANCE OF CONTRACEPTIVE PILLS: ICD-10-CM

## 2025-06-24 RX ORDER — DESOGESTREL AND ETHINYL ESTRADIOL 0.15-0.03
1 KIT ORAL DAILY
Qty: 84 TABLET | Refills: 0 | Status: SHIPPED | OUTPATIENT
Start: 2025-06-24

## 2025-06-24 NOTE — TELEPHONE ENCOUNTER
Requested Prescriptions   Pending Prescriptions Disp Refills    desogestrel-ethinyl estradiol (APRI) 0.15-30 MG-MCG tablet 84 tablet 0     Sig: Take 1 tablet by mouth daily.       Contraceptives Protocol Passed - 6/24/2025  1:53 PM        Passed - Patient is not a current smoker if age is 35 or older        Passed - Medication is active on med list and the sig matches. RN to manually verify dose and sig if red X/fail.     If the protocol passes (green check), you do not need to verify med dose and sig.    A prescription matches if they are the same clinical intention.    For Example: once daily and every morning are the same.    The protocol can not identify upper and lower case letters as matching and will fail.     For Example: Take 1 tablet (50 mg) by mouth daily     TAKE 1 TABLET (50 MG) BY MOUTH DAILY    For all fails (red x), verify dose and sig.    If the refill does match what is on file, the RN can still proceed to approve the refill request.       If they do not match, route to the appropriate provider.             Passed - Recent (12 month) or future (90 days) visit with authorizing provider's specialty (provided they have been seen in the past 15 months)     The patient must have completed an in-person or virtual visit within the past 12 months or has a future visit scheduled within the next 90 days with the authorizing provider s specialty.  Urgent care and e-visits do not qualify as an office visit for this protocol.          Passed - Medication indicated for associated diagnosis     Medication is associated with one or more of the following diagnoses:  Contraception  Acne  Dysmenorrhea  Menorrhagia  Amenorrhea  PCOS  Premenstrual Dysphoric Disorder  Irregular menses  Endometriosis  Contraceptive counseling  Finding of menstrual bleeding  Education about oral contraception  Uses contraception  Initial prescription of oral contraception  Oral contraception-no problem  Oral contraceptive repeat           Passed - No active pregnancy on record        Passed - No positive pregnancy test in past 12 months           Last Written Prescription Date:  8/1/2024  Last Fill Quantity: 84,  # refills: 0   Last office visit: 9/11/2024 ; last virtual visit: Visit date not found with prescribing provider:     Brianna Ortiz MD     Future Office Visit:

## 2025-08-12 ENCOUNTER — PATIENT OUTREACH (OUTPATIENT)
Dept: CARE COORDINATION | Facility: CLINIC | Age: 30
End: 2025-08-12
Payer: COMMERCIAL

## 2025-08-26 ENCOUNTER — PATIENT OUTREACH (OUTPATIENT)
Dept: CARE COORDINATION | Facility: CLINIC | Age: 30
End: 2025-08-26
Payer: COMMERCIAL